# Patient Record
(demographics unavailable — no encounter records)

---

## 2021-05-10 NOTE — EDM.PDOC
ED HPI GENERAL MEDICAL PROBLEM





- General


Chief Complaint: ENT Problem


Stated Complaint: SORE THROAT


Time Seen by Provider: 05/10/21 18:29


Source of Information: Reports: Patient


History Limitations: Reports: No Limitations





- History of Present Illness


INITIAL COMMENTS - FREE TEXT/NARRATIVE: 


HISTORY AND PHYSICAL:





History of present illness:


Patient is a 20-year-old female who presents to the emergency room with 

complaints of sore throat x 2 to 3 days.  Patient denies any fever, chills, 

headache, change in vision, syncope or near syncope. Denies any chest pain, back

pain, shortness of breath or cough. Denies any GI or  symptoms. Patient has 

been eating and drinking appropriately.





Review of systems: 


As per history of present illness and below otherwise all systems reviewed and 

negative.





Past medical history: 


As per history of present illness and as reviewed below otherwise 

noncontributory.





Surgical history: 


As per history of present illness and as reviewed below otherwise 

noncontributory.





Social history: 


See social history for further information





Family history: 


As per history of present illness and as reviewed below otherwise 

noncontributory.





Physical exam:


General: Well developed and well nourished. Alert and orientated x 3. Nontoxic 

in appearance and in no acute distress. Vital signs are stable and have been 

reviewed by me. Nursing notes were reviewed. 


HEENT: Atraumatic, normocephalic, pupils equal and reactive bilaterally, 

negative for conjunctival pallor or scleral icterus, mucous membranes moist, TMs

normal bilaterally, throat mildly erythematous without exudate (no pillar 

shifting or fullness),  neck supple, nontender, trachea midline. No drooling or 

trismus noted. No meningeal signs. No hot potato voice noted. 


Lungs: Clear to auscultation bilaterally. No wheezes, rales, or rhonchi.   Chest

nontender. Normal work of breathing, no accessory muscles used.


Heart: S1S2, regular rate and rhythm without overt murmur, gallops, or rubs. No 

JVD. No peripheral edema


Abdomen: Soft, nondistended, nontender. Normoactive bowel sounds. Negative for 

masses or costovertebral tenderness.


Pelvis: Stable nontender.


Genitourinary/Rectal: Deferred.


Skin: Intact, warm, dry. No lesions or rashes noted.


Hematologic: No petechiae or purpra. Mucosa appropriate color and normal nail 

bed color and refill.


Extremities: Atraumatic, moves all extremities per self without difficulty or 

deficits, negative for cords or calf pain. Neurovascular unremarkable.


Neuro: Awake, alert, oriented. Cranial nerves II through XII unremarkable. 

Cerebellum unremarkable. Motor and sensory unremarkable throughout. Exam 

nonfocal.


Psychiatric: Mood and affect are appropriate.  Normal thought process. Answering

questions appropriately.





Notes:


*This patient was seen and evaluated during the 2020 SARS-CoV-2 novel 

coronavirus pandemic period.  Community viral transmission is ongoing at time of

this encounter and the emergency department is operating under pandemic response

procedures.





I have talked with the patient about today's findings, in addition to providing 

specific details for plan of care.  Reassessment at the time of disposition 

demonstrates that the patient is in no acute distress.  The patient is stable 

for discharge, counseling was provided and we discussed in great detail signs 

and symptoms that would prompt them to return to the Emergency Department. 

Medication, follow up and supportive care measures were reviewed and discussed. 

Voices understanding and is agreeable to plan of care. Denies any further 

questions or concerns at this time.





Diagnostics:


Strep





Therapeutics:


None





Impression: 


Pharyngitis





Plan:


1. Take your medication as directed. Good handwashing and contact precautions as

we discussed.


2. Warm Salt water gargles (rinse and spit) 3-4 x daily. Please get a new tooth 

brush after completion of your medication


3. Tylenol and or ibuprofen as needed for pain management.


4. Follow-up with your primary care provider in the next 1-2 days. Return to the

ED as needed and as discussed.





Definitive disposition and diagnosis as appropriate pending reevaluation and 

review of above.





  ** Throat


Pain Score (Numeric/FACES): 7





- Related Data


                                    Allergies











Allergy/AdvReac Type Severity Reaction Status Date / Time


 


ginger Allergy  Rash Verified 01/17/19 21:26


 


pistachio nut Allergy  Swollen Verified 01/17/19 21:26





   Tongue  











Home Meds: 


                                    Home Meds





FLUoxetine [PROzac] 20 mg PO DAILY 09/19/18 [History]











Past Medical History


HEENT History: Reports: None


Cardiovascular History: Reports: None


Respiratory History: Reports: None


Gastrointestinal History: Reports: None


Genitourinary History: Reports: None


OB/GYN History: Reports: None


Musculoskeletal History: Reports: None


Neurological History: Reports: None


Psychiatric History: Reports: Anxiety, Depression


Endocrine/Metabolic History: Reports: None


Hematologic History: Reports: None


Oncologic (Cancer) History: Reports: None


Dermatologic History: Reports: None





- Infectious Disease History


Infectious Disease History: Reports: Chicken Pox





- Past Surgical History


Head Surgeries/Procedures: Reports: None





Social & Family History





- Family History


Family Medical History: No Pertinent Family History





- Tobacco Use


Tobacco Use Status *Q: Never Tobacco User





- Caffeine Use


Caffeine Use: Reports: None





- Recreational Drug Use


Recreational Drug Use: No





ED ROS ENT





- Review of Systems


Review Of Systems: Comprehensive ROS is negative, except as noted in HPI.





ED EXAM, ENT





- Physical Exam


Exam: See Below (See dictation)





Course





- Vital Signs


Last Recorded V/S: 


                                Last Vital Signs











Temp  97.6 F   05/10/21 18:47


 


Pulse  92   05/10/21 20:00


 


Resp  17   05/10/21 20:00


 


BP  130/67   05/10/21 20:00


 


Pulse Ox  100   05/10/21 20:00














- Orders/Labs/Meds


Labs: 


                                Laboratory Tests











  05/10/21 Range/Units





  18:50 


 


Group A Strep (PCR)  NOT DETECTED  (NOT DETECT)  














Departure





- Departure


Time of Disposition: 19:50


Disposition: Home, Self-Care 01


Clinical Impression: 


Pharyngitis


Qualifiers:


 Pharyngitis/tonsillitis etiology: unspecified etiology Qualified Code(s): J02.9

 - Acute pharyngitis, unspecified








- Discharge Information


Instructions:  Pharyngitis, Easy-to-Read


Referrals: 


PCP,None [Primary Care Provider] - 


Forms:  ED Department Discharge


Additional Instructions: 


The following information is given to patients seen in the emergency department 

who are being discharged to home. This information is to outline your options 

for follow-up care. We provide all patients seen in our emergency department 

with a follow-up referral.





The need for follow-up, as well as the timing and circumstances, are variable 

depending upon the specifics of your emergency department visit.





If you don't have a primary care physician on staff, we will provide you with a 

referral. We always advise you to contact your personal physician following an 

emergency department visit to inform them of the circumstance of the visit and 

for follow-up with them and/or the need for any referrals to a consulting 

specialist.





The emergency department will also refer you to a specialist when appropriate. 

This referral assures that you have the opportunity for follow-up care with a 

specialist. All of these measure are taken in an effort to provide you with 

optimal care, which includes your follow-up.





Under all circumstances we always encourage you to contact your private 

physician who remains a resource for coordinating your care. When calling for 

follow-up care, please make the office aware that this follow-up is from your 

recent emergency room visit. If for any reason you are refused follow-up, please

contact the [facility] Emergency Department

at [phone] and asked to speak to the emergency department charge nurse.





[facility]


Primary Care


[address] [place] [id number]


Phone: [phone]


Fax: [phone]





[facility]


[address] [place] [id number]


Phone: [phone]


Fax: [phone]





Thank you for choosing the [facility] emergency department in 

[place] for your medical needs today.  It was a pleasure caring for you. Today

you were seen in the emergency department for sore throat





1. Take your medication as directed. Good handwashing and contact precautions as

we discussed.


2. Warm Salt water gargles (rinse and spit) 3-4 x daily. Please get a new tooth 

brush after completion of your medication


3. Tylenol and or ibuprofen as needed for pain management.


4. Follow-up with your primary care provider in the next 1-2 days. Return to the

ED as needed and as discussed.








Sepsis Event Note (ED)





- Evaluation


Sepsis Screening Result: No Definite Risk